# Patient Record
Sex: FEMALE | Race: OTHER | HISPANIC OR LATINO
[De-identification: names, ages, dates, MRNs, and addresses within clinical notes are randomized per-mention and may not be internally consistent; named-entity substitution may affect disease eponyms.]

---

## 2017-03-22 ENCOUNTER — APPOINTMENT (OUTPATIENT)
Dept: ORTHOPEDIC SURGERY | Facility: CLINIC | Age: 52
End: 2017-03-22

## 2017-03-22 DIAGNOSIS — Z78.9 OTHER SPECIFIED HEALTH STATUS: ICD-10-CM

## 2017-03-22 DIAGNOSIS — J30.89 OTHER ALLERGIC RHINITIS: ICD-10-CM

## 2017-03-30 ENCOUNTER — APPOINTMENT (OUTPATIENT)
Dept: ORTHOPEDIC SURGERY | Facility: CLINIC | Age: 52
End: 2017-03-30

## 2017-03-30 DIAGNOSIS — M25.562 PAIN IN LEFT KNEE: ICD-10-CM

## 2017-03-30 RX ORDER — IBUPROFEN 200 MG/1
TABLET, COATED ORAL
Refills: 0 | Status: DISCONTINUED | COMMUNITY
End: 2017-03-30

## 2017-04-13 ENCOUNTER — APPOINTMENT (OUTPATIENT)
Dept: ORTHOPEDIC SURGERY | Facility: CLINIC | Age: 52
End: 2017-04-13

## 2017-04-17 ENCOUNTER — FORM ENCOUNTER (OUTPATIENT)
Age: 52
End: 2017-04-17

## 2017-04-18 ENCOUNTER — APPOINTMENT (OUTPATIENT)
Dept: ORTHOPEDIC SURGERY | Facility: CLINIC | Age: 52
End: 2017-04-18

## 2017-04-18 ENCOUNTER — OUTPATIENT (OUTPATIENT)
Dept: OUTPATIENT SERVICES | Facility: HOSPITAL | Age: 52
LOS: 1 days | End: 2017-04-18
Payer: COMMERCIAL

## 2017-04-18 DIAGNOSIS — M79.671 PAIN IN RIGHT FOOT: ICD-10-CM

## 2017-04-18 PROCEDURE — 73630 X-RAY EXAM OF FOOT: CPT

## 2017-04-18 PROCEDURE — 73630 X-RAY EXAM OF FOOT: CPT | Mod: 26,RT

## 2017-05-12 ENCOUNTER — APPOINTMENT (OUTPATIENT)
Dept: ORTHOPEDIC SURGERY | Facility: CLINIC | Age: 52
End: 2017-05-12

## 2017-06-09 ENCOUNTER — APPOINTMENT (OUTPATIENT)
Dept: ORTHOPEDIC SURGERY | Facility: CLINIC | Age: 52
End: 2017-06-09
Payer: COMMERCIAL

## 2017-06-09 DIAGNOSIS — M17.12 UNILATERAL PRIMARY OSTEOARTHRITIS, LEFT KNEE: ICD-10-CM

## 2017-06-09 DIAGNOSIS — M72.2 PLANTAR FASCIAL FIBROMATOSIS: ICD-10-CM

## 2017-06-09 PROCEDURE — 99214 OFFICE O/P EST MOD 30 MIN: CPT

## 2017-06-09 RX ORDER — MAGNESIUM OXIDE 400 MG
400 (241.3 MG) TABLET ORAL
Qty: 30 | Refills: 0 | Status: ACTIVE | COMMUNITY
Start: 2017-05-26

## 2017-06-09 RX ORDER — IBUPROFEN 200 MG/1
TABLET, COATED ORAL
Refills: 0 | Status: DISCONTINUED | COMMUNITY
End: 2017-06-09

## 2017-06-09 RX ORDER — METRONIDAZOLE 7.5 MG/G
0.75 GEL VAGINAL
Qty: 70 | Refills: 0 | Status: DISCONTINUED | COMMUNITY
Start: 2017-02-02 | End: 2017-06-09

## 2017-07-14 ENCOUNTER — APPOINTMENT (OUTPATIENT)
Dept: ORTHOPEDIC SURGERY | Facility: CLINIC | Age: 52
End: 2017-07-14

## 2017-10-18 ENCOUNTER — FORM ENCOUNTER (OUTPATIENT)
Age: 52
End: 2017-10-18

## 2017-10-19 ENCOUNTER — APPOINTMENT (OUTPATIENT)
Dept: ORTHOPEDIC SURGERY | Facility: CLINIC | Age: 52
End: 2017-10-19
Payer: COMMERCIAL

## 2017-10-19 ENCOUNTER — APPOINTMENT (OUTPATIENT)
Dept: RADIOLOGY | Facility: CLINIC | Age: 52
End: 2017-10-19
Payer: COMMERCIAL

## 2017-10-19 ENCOUNTER — OUTPATIENT (OUTPATIENT)
Dept: OUTPATIENT SERVICES | Facility: HOSPITAL | Age: 52
LOS: 1 days | End: 2017-10-19

## 2017-10-19 VITALS — HEIGHT: 61 IN | WEIGHT: 130 LBS | BODY MASS INDEX: 24.55 KG/M2

## 2017-10-19 DIAGNOSIS — M25.569 PAIN IN UNSPECIFIED KNEE: ICD-10-CM

## 2017-10-19 DIAGNOSIS — M23.307 OTHER MENISCUS DERANGEMENTS, UNSPECIFIED MENISCUS, LEFT KNEE: ICD-10-CM

## 2017-10-19 DIAGNOSIS — Z80.9 FAMILY HISTORY OF MALIGNANT NEOPLASM, UNSPECIFIED: ICD-10-CM

## 2017-10-19 DIAGNOSIS — M54.16 RADICULOPATHY, LUMBAR REGION: ICD-10-CM

## 2017-10-19 PROCEDURE — 73564 X-RAY EXAM KNEE 4 OR MORE: CPT | Mod: 26,LT

## 2017-10-19 PROCEDURE — 20610 DRAIN/INJ JOINT/BURSA W/O US: CPT | Mod: LT

## 2017-10-19 PROCEDURE — 72110 X-RAY EXAM L-2 SPINE 4/>VWS: CPT | Mod: 26

## 2017-10-19 PROCEDURE — 99214 OFFICE O/P EST MOD 30 MIN: CPT | Mod: 25

## 2017-10-19 RX ORDER — UBIDECARENONE/VIT E ACET 100MG-5
CAPSULE ORAL
Refills: 0 | Status: ACTIVE | COMMUNITY

## 2017-10-20 PROBLEM — M54.16 BILATERAL LUMBAR RADICULOPATHY: Status: ACTIVE | Noted: 2017-10-20

## 2017-10-20 PROBLEM — M23.307 DEGENERATIVE TEAR OF MENISCUS OF LEFT KNEE: Status: ACTIVE | Noted: 2017-03-30

## 2017-10-20 PROBLEM — Z80.9 FAMILY HISTORY OF MALIGNANT NEOPLASM: Status: ACTIVE | Noted: 2017-10-19

## 2017-10-20 PROBLEM — M25.569 KNEE PAIN: Status: ACTIVE | Noted: 2017-10-19

## 2017-10-31 ENCOUNTER — APPOINTMENT (OUTPATIENT)
Dept: ORTHOPEDIC SURGERY | Facility: CLINIC | Age: 52
End: 2017-10-31

## 2017-11-20 ENCOUNTER — APPOINTMENT (OUTPATIENT)
Dept: ORTHOPEDIC SURGERY | Facility: CLINIC | Age: 52
End: 2017-11-20

## 2017-11-20 ENCOUNTER — APPOINTMENT (OUTPATIENT)
Dept: RADIOLOGY | Facility: CLINIC | Age: 52
End: 2017-11-20

## 2018-01-30 ENCOUNTER — APPOINTMENT (OUTPATIENT)
Dept: OTOLARYNGOLOGY | Facility: CLINIC | Age: 53
End: 2018-01-30

## 2018-02-27 ENCOUNTER — APPOINTMENT (OUTPATIENT)
Dept: OTOLARYNGOLOGY | Facility: CLINIC | Age: 53
End: 2018-02-27
Payer: COMMERCIAL

## 2018-02-27 VITALS
DIASTOLIC BLOOD PRESSURE: 66 MMHG | TEMPERATURE: 97.9 F | HEART RATE: 72 BPM | OXYGEN SATURATION: 100 % | SYSTOLIC BLOOD PRESSURE: 92 MMHG

## 2018-02-27 DIAGNOSIS — H90.3 SENSORINEURAL HEARING LOSS, BILATERAL: ICD-10-CM

## 2018-02-27 DIAGNOSIS — H93.11 TINNITUS, RIGHT EAR: ICD-10-CM

## 2018-02-27 DIAGNOSIS — R51 HEADACHE: ICD-10-CM

## 2018-02-27 PROCEDURE — 92550 TYMPANOMETRY & REFLEX THRESH: CPT

## 2018-02-27 PROCEDURE — 92557 COMPREHENSIVE HEARING TEST: CPT

## 2018-02-27 PROCEDURE — 99204 OFFICE O/P NEW MOD 45 MIN: CPT

## 2018-04-10 ENCOUNTER — APPOINTMENT (OUTPATIENT)
Dept: OTOLARYNGOLOGY | Facility: CLINIC | Age: 53
End: 2018-04-10

## 2020-10-22 ENCOUNTER — APPOINTMENT (OUTPATIENT)
Dept: ORTHOPEDIC SURGERY | Facility: CLINIC | Age: 55
End: 2020-10-22

## 2020-11-03 ENCOUNTER — OUTPATIENT (OUTPATIENT)
Dept: OUTPATIENT SERVICES | Facility: HOSPITAL | Age: 55
LOS: 1 days | End: 2020-11-03
Payer: COMMERCIAL

## 2020-11-03 ENCOUNTER — APPOINTMENT (OUTPATIENT)
Dept: ORTHOPEDIC SURGERY | Facility: CLINIC | Age: 55
End: 2020-11-03
Payer: COMMERCIAL

## 2020-11-03 ENCOUNTER — RESULT REVIEW (OUTPATIENT)
Age: 55
End: 2020-11-03

## 2020-11-03 VITALS
TEMPERATURE: 97.6 F | SYSTOLIC BLOOD PRESSURE: 120 MMHG | WEIGHT: 140 LBS | DIASTOLIC BLOOD PRESSURE: 70 MMHG | BODY MASS INDEX: 26.43 KG/M2 | HEART RATE: 71 BPM | HEIGHT: 61 IN | OXYGEN SATURATION: 98 %

## 2020-11-03 DIAGNOSIS — M23.311: ICD-10-CM

## 2020-11-03 PROCEDURE — 99072 ADDL SUPL MATRL&STAF TM PHE: CPT

## 2020-11-03 PROCEDURE — 72170 X-RAY EXAM OF PELVIS: CPT

## 2020-11-03 PROCEDURE — 73564 X-RAY EXAM KNEE 4 OR MORE: CPT

## 2020-11-03 PROCEDURE — 99215 OFFICE O/P EST HI 40 MIN: CPT | Mod: 25

## 2020-11-03 PROCEDURE — 72170 X-RAY EXAM OF PELVIS: CPT | Mod: 26

## 2020-11-03 PROCEDURE — 20610 DRAIN/INJ JOINT/BURSA W/O US: CPT | Mod: RT

## 2020-11-03 PROCEDURE — 73564 X-RAY EXAM KNEE 4 OR MORE: CPT | Mod: 26,50

## 2020-11-03 RX ORDER — MELOXICAM 7.5 MG/1
7.5 TABLET ORAL TWICE DAILY
Qty: 40 | Refills: 1 | Status: COMPLETED | COMMUNITY
Start: 2017-10-19 | End: 2020-11-03

## 2020-11-03 RX ORDER — AZELASTINE HYDROCHLORIDE 0.5 MG/ML
0.05 SOLUTION/ DROPS OPHTHALMIC
Qty: 6 | Refills: 0 | Status: COMPLETED | COMMUNITY
Start: 2017-06-01 | End: 2020-11-03

## 2020-11-03 RX ORDER — MELOXICAM 7.5 MG/1
7.5 TABLET ORAL DAILY
Qty: 30 | Refills: 2 | Status: ACTIVE | COMMUNITY
Start: 2020-11-03 | End: 1900-01-01

## 2020-11-03 RX ORDER — PSEUDOEPHEDRINE HCL 30 MG
TABLET ORAL
Refills: 0 | Status: ACTIVE | COMMUNITY

## 2020-11-03 RX ORDER — MULTIVIT-MIN/IRON/FOLIC ACID/K 18-600-40
CAPSULE ORAL
Refills: 0 | Status: ACTIVE | COMMUNITY

## 2020-11-03 RX ORDER — VITAMIN E 268 MG
400 CAPSULE ORAL
Refills: 0 | Status: ACTIVE | COMMUNITY

## 2020-11-03 RX ORDER — BUTALBITAL, ACETAMINOPHEN AND CAFFEINE 325; 50; 40 MG/1; MG/1; MG/1
50-325-40 TABLET ORAL
Qty: 60 | Refills: 0 | Status: COMPLETED | COMMUNITY
Start: 2017-05-26 | End: 2020-11-03

## 2020-11-03 NOTE — PHYSICAL EXAM
[de-identified] : General appearance: well nourished and hydrated, pleasant, alert and oriented x 3, cooperative.  \par HEENT: normocephalic, EOM intact, wearing mask, external auditory canal clear.  \par Cardiovascular: no lower leg edema, no varicosities, dorsalis pedis pulses palpable and symmetric.  \par Lymphatics: no palpable lymphadenopathy, no lymphedema.  \par Neurologic: sensation is normal, no muscle weakness in upper or lower extremities, patella tendon reflexes present and symmetric.  \par Dermatologic: skin moist, warm, no rash.  \par Spine: cervical spine with normal lordosis and painless range of motion, thoracic spine with normal kyphosis and painless range of motion, lumbosacral spine with normal lordosis and painless range of motion.  No tenderness to palpation along midline spine and paraspinal musculature.  Sacroiliac joints nontender bilaterally. Negative SLR and crossed SLR tests bilaterally.\par Gait: heavily antalgic right.\par \par Left knee:\par - Inspection: trace effusion, negative ecchymosis and erythema.  \par - Wounds: none.\par - Alignment: mild correctable varus.\par - Palpation: no specific tenderness on palpation.  \par - ROM active: 0-130, no pain on extremes of motion\par - Ligamentous laxity: negative Lachman, negative ant. drawer test, negative post. drawer test, negative pivot shift test, pseudolaxity to varus stress with a firm endpoint, stable to valgus stress.  \par - Popliteal angle: 45 degrees\par - Muscle Test: 5/5 quad strength.  \par \par Right knee:\par - Inspection: negative swelling, ecchymosis, and erythema.  \par - Wounds: none.  \par - Alignment: normal.  \par - Palpation: anteromedial joint line tenderness on palpation.  \par - ROM active: 0-135, pain on extremes of motion\par - Ligamentous laxity: negative Lachman, negative ant. drawer test, negative post. drawer test, negative pivot shift test, stable to varus stress, stable to valgus stress.  \par - Meniscal Test: very painful Alis and Balaji.  \par - Popliteal angle: 45 degrees\par - Muscle Test: 5/5 quad strength. [de-identified] : AP pelvis and 4 views of the bilateral knees (weightbearing AP, weightbearing Epperson, weightbearing lateral, and Sunrise) were obtained today and reviewed with the patient.\par \par Bilateral hips demonstrate normal alignment without arthritis (Tonnis 0). Bilateral cam and pincer lesions are present. There is no fracture or prior fracture deformity. There is no radiographic evidence of osteonecrosis.\par \par The left knee demonstrates mild varus malalignment with lateral tibial subluxation. There is osteoarthritis affecting the medial and patellofemoral compartments, Kellgren-José Miguel 3. The patella sits at appropriate height and displays slight lateral subluxation.\par \par The right knee demonstrates normal alignment in the coronal and sagittal planes. There is no arthritis in the medial or lateral compartments, Kellgren-José Miguel 0. The patella sits at appropriate height and displays slight lateral subluxation.

## 2020-11-03 NOTE — HISTORY OF PRESENT ILLNESS
[de-identified] : 54y/o female presenting for right greater than left knee pain. Right knee pain is severe and acute as of 10/19/20, left knee pain is chronic for several years and generally quite mild. She reports that she has been walking a couple of miles per day during the pandemic. She has also been doing body weight squats. It was after one of these sessions that the right knee became symptomatic. Pain is localized to the anteromedial joint line without proximal or distal radiation. She has pain with full flexion or extension of the knee, and particularly with any sort of twisting motion. She describes some rare locking episodes. She is limping heavily. This is the first instance of right knee pain ever. Left knee has known OA and degenerative meniscal tear, which has been minimally symptomatic since receiving some CSI by Miky Pearce and Adarsh about three years ago. No new left knee symptoms. [Stable] : stable [10] : a current pain level of 10/10 [Standing] : standing [Lifting] : lifting [Constant] : ~He/She~ states the symptoms seem to be constant [Bending] : worsened by bending [Sitting] : worsened by sitting [Walking] : worsened by walking [Knee Flexion] : worsened with knee flexion [Knee Extension] : worsened with knee extension [Heat] : relieved by heat [NSAIDs] : relieved by nonsteroidal anti-inflammatory drugs [de-identified] : sharp

## 2020-11-03 NOTE — PROCEDURE
[Injection] : Injection [Right] : of the right [Knee Joint] : knee joint [Inflammation] : Inflammation [Joint Pain] : Joint pain [Patient] : patient [Alcohol] : Alcohol [Betadine] : Betadine [Ethyl Chloride Spray] : ethyl chloride spray was used as a topical anesthetic [Lateral] : lateral [Anterior] : anterior [20] : a 20-gauge [1% Lidocaine___(mL)] : [unfilled] mL of 1% Lidocaine [Triamcinolone 40mg/mL___(mL)] : [unfilled] ~UmL of 40mg/mL triamcinolone [Bandage Applied] : a bandage [Tolerated Well] : The patient tolerated the procedure well [None] : none

## 2020-11-03 NOTE — REVIEW OF SYSTEMS
[Joint Pain] : joint pain [Joint Swelling] : joint swelling [Feeling Tired] : fatigue [Hot Flashes] : hot flashes [Negative] : Heme/Lymph

## 2020-11-03 NOTE — DISCUSSION/SUMMARY
[de-identified] : 54y/o female with right medial meniscus tear, left knee osteoarthritis\par - CSI administered today with profound immediate relief\par - Start PT\par - Encouraged HEP with daily low impact exercise; stop squatting for now\par - Tylenol + meloxicam + diclofenac gel as needed\par - Cryo\par - RTC 6wk; consider MRI if symptoms fail to resolve or if any ongoing mechanical sx

## 2020-12-15 ENCOUNTER — APPOINTMENT (OUTPATIENT)
Dept: ORTHOPEDIC SURGERY | Facility: CLINIC | Age: 55
End: 2020-12-15

## 2020-12-21 ENCOUNTER — RX RENEWAL (OUTPATIENT)
Age: 55
End: 2020-12-21

## 2020-12-21 RX ORDER — DICLOFENAC SODIUM 1% 10 MG/G
1 GEL TOPICAL
Qty: 100 | Refills: 0 | Status: ACTIVE | COMMUNITY
Start: 2020-11-03 | End: 1900-01-01